# Patient Record
Sex: FEMALE | Race: WHITE | NOT HISPANIC OR LATINO | Employment: OTHER | URBAN - METROPOLITAN AREA
[De-identification: names, ages, dates, MRNs, and addresses within clinical notes are randomized per-mention and may not be internally consistent; named-entity substitution may affect disease eponyms.]

---

## 2017-01-05 ENCOUNTER — TELEPHONE (OUTPATIENT)
Dept: FAMILY MEDICINE | Facility: CLINIC | Age: 72
End: 2017-01-05

## 2017-01-05 LAB — INR PPP: 3.1

## 2017-01-05 NOTE — TELEPHONE ENCOUNTER
----- Message from Lena Xiao sent at 1/5/2017  2:01 PM CST -----  Contact: [facility]/[phone]  Facial drooping and need to speak to you.  Call [facility]/Bruna at [phone].

## 2017-01-06 ENCOUNTER — TELEPHONE (OUTPATIENT)
Dept: FAMILY MEDICINE | Facility: CLINIC | Age: 72
End: 2017-01-06

## 2017-01-06 ENCOUNTER — ANTI-COAG VISIT (OUTPATIENT)
Dept: CARDIOLOGY | Facility: CLINIC | Age: 72
End: 2017-01-06

## 2017-01-06 ENCOUNTER — PATIENT OUTREACH (OUTPATIENT)
Dept: ADMINISTRATIVE | Facility: HOSPITAL | Age: 72
End: 2017-01-06

## 2017-01-06 DIAGNOSIS — I82.A19 AXILLARY VEIN THROMBOSIS: ICD-10-CM

## 2017-01-06 NOTE — TELEPHONE ENCOUNTER
----- Message from Geno Ordoñez sent at 1/5/2017  4:35 PM CST -----  Contact: [facility] # [phone]  Returning call to Su  Call back on # [phone]  thanks

## 2017-01-06 NOTE — LETTER
January 6, 2017    Kalen Del Cid  [address] [address] [address]             [facility]  [address]  [place] [address]  Phone: [phone] Dear Mrs. Del Cid:    [facility] is committed to your overall health.  To help you get the most out of each of your visits, we will review your information to make sure you are up to date on all of your recommended tests and/or procedures.      Dr. Valle         has found that you may be due for:    Mammogram  Annual Dilated Eye Exam    If you have had any of the above done at another facility, please bring the records or information with you so that your record at [facility] will be complete.     If you are currently taking medication , please bring it with you to your appointment for review.    If you have any questions or concerns, please don't hesitate to call.    Sincerely,      Maame Gutierres  Clinical Care Coordinator  [facility] [address] [place] [address]  Phone: [phone]   Fax: [phone]

## 2017-01-06 NOTE — TELEPHONE ENCOUNTER
Spoke to Nurse Mcfarland and she states that that on yesterday she went to discharge the pt but couldn't because the pt had experienced a fall on Tuesday and has some bruising in a T  shape around forehead and eyes.     Nurse Bruna states the pt had irregular pupil non reactive on the right side. Right eye droopy and corner of mouth on right side sunken in. States she does not know if it could have been a TIA or CVA. States she is going to continue to observe her but wanted to let you know. Please advise. Thanks.

## 2017-01-06 NOTE — PROGRESS NOTES
[facility] for deja informing her of dosing and next inr check date, she was asked to return call to clinic to confirm she received message.

## 2017-01-10 NOTE — TELEPHONE ENCOUNTER
Spoke to nurse Mcfarland and she states she did advise pt to go to the ER but the pt sister refused stating the pt did not show signs of distress. Nurse Mcfarland states that she is scheduled to go out to see the pt on Thursday and will f/u with you.

## 2017-01-11 ENCOUNTER — HOSPITAL ENCOUNTER (OUTPATIENT)
Dept: RADIOLOGY | Facility: HOSPITAL | Age: 72
Discharge: HOME | End: 2017-01-11
Attending: FAMILY MEDICINE
Payer: MEDICARE

## 2017-01-11 DIAGNOSIS — Z12.39 SCREENING FOR BREAST CANCER: ICD-10-CM

## 2017-01-11 DIAGNOSIS — Z12.31 ENCOUNTER FOR SCREENING MAMMOGRAM FOR MALIGNANT NEOPLASM OF BREAST: ICD-10-CM

## 2017-01-11 PROCEDURE — 77067 SCR MAMMO BI INCL CAD: CPT | Mod: TC

## 2017-01-11 PROCEDURE — 77067 SCR MAMMO BI INCL CAD: CPT | Mod: 26,,, | Performed by: RADIOLOGY

## 2017-01-11 PROCEDURE — 77063 BREAST TOMOSYNTHESIS BI: CPT | Mod: 26,,, | Performed by: RADIOLOGY

## 2017-01-16 ENCOUNTER — ANTI-COAG VISIT (OUTPATIENT)
Dept: CARDIOLOGY | Facility: CLINIC | Age: 72
End: 2017-01-16

## 2017-01-16 DIAGNOSIS — I82.A19 AXILLARY VEIN THROMBOSIS: ICD-10-CM

## 2017-01-16 LAB — INR PPP: 2.7

## 2017-01-20 ENCOUNTER — OFFICE VISIT (OUTPATIENT)
Dept: FAMILY MEDICINE | Facility: CLINIC | Age: 72
End: 2017-01-20
Payer: MEDICARE

## 2017-01-20 VITALS
HEART RATE: 68 BPM | HEIGHT: 61 IN | SYSTOLIC BLOOD PRESSURE: 104 MMHG | RESPIRATION RATE: 18 BRPM | OXYGEN SATURATION: 96 % | DIASTOLIC BLOOD PRESSURE: 66 MMHG | BODY MASS INDEX: 20.93 KG/M2 | WEIGHT: 110.88 LBS

## 2017-01-20 DIAGNOSIS — E11.69 TYPE 2 DIABETES MELLITUS WITH OTHER SPECIFIED COMPLICATION, UNSPECIFIED LONG TERM INSULIN USE STATUS: Primary | ICD-10-CM

## 2017-01-20 DIAGNOSIS — I25.10 CORONARY ARTERY DISEASE INVOLVING NATIVE CORONARY ARTERY OF NATIVE HEART WITHOUT ANGINA PECTORIS: ICD-10-CM

## 2017-01-20 DIAGNOSIS — G30.9 ALZHEIMER'S DEMENTIA WITH BEHAVIORAL DISTURBANCE, UNSPECIFIED TIMING OF DEMENTIA ONSET: ICD-10-CM

## 2017-01-20 DIAGNOSIS — I10 ESSENTIAL HYPERTENSION: ICD-10-CM

## 2017-01-20 DIAGNOSIS — E78.00 HYPERCHOLESTEREMIA: ICD-10-CM

## 2017-01-20 DIAGNOSIS — T74.11XS: ICD-10-CM

## 2017-01-20 DIAGNOSIS — I44.7 LBBB (LEFT BUNDLE BRANCH BLOCK): ICD-10-CM

## 2017-01-20 DIAGNOSIS — Z97.4 HEARING AID WORN: ICD-10-CM

## 2017-01-20 DIAGNOSIS — F02.81 ALZHEIMER'S DEMENTIA WITH BEHAVIORAL DISTURBANCE, UNSPECIFIED TIMING OF DEMENTIA ONSET: ICD-10-CM

## 2017-01-20 DIAGNOSIS — H26.9 CATARACT, UNSPECIFIED CATARACT TYPE, UNSPECIFIED LATERALITY: ICD-10-CM

## 2017-01-20 DIAGNOSIS — Z95.5 S/P CORONARY ARTERY STENT PLACEMENT: ICD-10-CM

## 2017-01-20 PROCEDURE — 99213 OFFICE O/P EST LOW 20 MIN: CPT | Mod: PBBFAC,PO | Performed by: FAMILY MEDICINE

## 2017-01-20 PROCEDURE — 99214 OFFICE O/P EST MOD 30 MIN: CPT | Mod: S$PBB,,, | Performed by: FAMILY MEDICINE

## 2017-01-20 PROCEDURE — 99999 PR PBB SHADOW E&M-EST. PATIENT-LVL III: CPT | Mod: PBBFAC,,, | Performed by: FAMILY MEDICINE
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
## 2017-01-27 DIAGNOSIS — I25.10 CORONARY ARTERY DISEASE INVOLVING NATIVE CORONARY ARTERY WITHOUT ANGINA PECTORIS, UNSPECIFIED WHETHER NATIVE OR TRANSPLANTED HEART: ICD-10-CM

## 2017-01-27 DIAGNOSIS — E78.00 HYPERCHOLESTEREMIA: ICD-10-CM

## 2017-01-27 DIAGNOSIS — Z95.5 S/P CORONARY ARTERY STENT PLACEMENT: ICD-10-CM

## 2017-01-27 RX ORDER — ATORVASTATIN CALCIUM 40 MG/1
TABLET, FILM COATED ORAL
Qty: 30 TABLET | Refills: 5 | Status: SHIPPED | OUTPATIENT
Start: 2017-01-27

## 2017-01-31 ENCOUNTER — OFFICE VISIT (OUTPATIENT)
Dept: OPTOMETRY | Facility: CLINIC | Age: 72
End: 2017-01-31
Payer: MEDICARE

## 2017-01-31 DIAGNOSIS — E11.9 DIABETES MELLITUS TYPE 2 WITHOUT RETINOPATHY: Primary | ICD-10-CM

## 2017-01-31 DIAGNOSIS — H43.819 POSTERIOR VITREOUS DETACHMENT, UNSPECIFIED LATERALITY: ICD-10-CM

## 2017-01-31 DIAGNOSIS — Z96.1 BILATERAL PSEUDOPHAKIA: ICD-10-CM

## 2017-01-31 DIAGNOSIS — H31.012 MACULAR SCAR, LEFT: ICD-10-CM

## 2017-01-31 DIAGNOSIS — Z13.5 GLAUCOMA SCREENING: ICD-10-CM

## 2017-01-31 DIAGNOSIS — H21.561 PUPILLARY ABNORMALITIES, RIGHT: ICD-10-CM

## 2017-01-31 PROCEDURE — 92004 COMPRE OPH EXAM NEW PT 1/>: CPT | Mod: S$PBB,,, | Performed by: OPTOMETRIST

## 2017-01-31 PROCEDURE — 99212 OFFICE O/P EST SF 10 MIN: CPT | Mod: PBBFAC,PO | Performed by: OPTOMETRIST

## 2017-01-31 PROCEDURE — 99999 PR PBB SHADOW E&M-EST. PATIENT-LVL II: CPT | Mod: PBBFAC,,, | Performed by: OPTOMETRIST

## 2017-01-31 NOTE — LETTER
January 31, 2017      HEIDY Valle MD  [address] [facility] [address] [place] [address]           [place] [facility]  [address] [facility] [place] [address]  Phone: [phone]  Fax: [phone]          Patient: Kalen Del Cid   MR Number: [id number]   YOB: 1945   Date of Visit: 1/31/2017       Dear Dr. HEIDY Valle:    Thank you for referring Kalen Del Cid to me for evaluation. Attached you will find relevant portions of my assessment and plan of care.    If you have questions, please do not hesitate to call me. I look forward to following Kalen Del Cid along with you.    Sincerely,    ROLA Galvez, OD    Enclosure  CC:  No Recipients    If you would like to receive this communication electronically, please contact [email or web address] or [phone] to request more information on [facility] access.    For providers and/or their staff who would like to refer a patient to [facility], please contact us through our one-stop-shop provider referral line, [facility], at [phone].    If you feel you have received this communication in error or would no longer like to receive these types of communications, please e-mail [email or web address]

## 2017-01-31 NOTE — PATIENT INSTRUCTIONS
"FLASHES / FLOATERS / POSTERIOR VITREOUS DETACHMENT    Call the clinic if you have any further changes in symptoms.  Including:  Increased numbers of floaters or flashing lights, dimness or darkness that moves through or stays constant in your vision, or any pain in the eye (s).            DRY EYES:  Use Over The Counter artificial tears as needed for dry eye symptoms.  Some common brands include:  Systane, Optive, and Refresh.  These drops can be used as frequently as desired, but may be most helpful use during long periods of concentrated work.  For example, reading / working at the computer.  Avoid drops that "get redness out", as these contain medication that may further irritate the eyes.    ALLERGY EYES / SYMPTOMS:    Over the counter medications include--Zaditor and Alaway  Use as directed 1-2 drops daily for symptoms of itching / watering eyes.  These drops will not help for dry eye or exposure symptoms.    DIABETES AND THE EYE / DIABETIC RETINOPATHY    Diabetic retinopathy is a condition occurring in persons with diabetes, which causes progressive damage to the retina, the light sensitive lining at the back of the eye. It is a serious sight-threatening complication of diabetes.    Diabetic retinopathy is the result of damage to the tiny blood vessels that nourish the retina. They leak blood and other fluids that cause swelling of retinal tissue and clouding of vision. The condition usually affects both eyes. The longer a person has diabetes, the more likely they will develop diabetic retinopathy. If left untreated, diabetic retinopathy can cause blindness.  There are two basic types of diabetic retinopathy:    Background or nonproliferative diabetic retinopathy (NPDR)  Nonproliferative diabetic retinopathy (NPDR) is the earliest stage of diabetic retinopathy. With this condition, damaged blood vessels in the retina begin to leak extra fluid and small amounts of blood into the eye. Sometimes, deposits of " cholesterol or other fats from the blood may leak into the retina. Many people with diabetes have mild NPDR, which usually does not affect their vision. However, if their vision is affected, it is the result of macular edema and macular ischemia.    If vision is affected due to macular changes, a consult with a Retina Specialist may be advised.  This is an ophthalmologist that treats retina conditions, including diabetic retinopathy.     Proliferative diabetic retinopathy (PDR)  Proliferative diabetic retinopathy (PDR) mainly occurs when many of the blood vessels in the retina close, preventing enough blood flow. In an attempt to supply blood to the area where the original vessels closed, the retina responds by growing new blood vessels. This is called neovascularization. However, these new blood vessels are abnormal and do not supply the retina with proper blood flow. The new vessels are also often accompanied by scar tissue that may cause the retina to wrinkle or detach. PDR may cause more severe vision loss than NPDR because it can affect both central and peripheral vision.     A patient diagnosed with proliferative diabetic eye disease will be referred to a retinal specialist for consultation.    Often there are no visual symptoms in the early stages of diabetic retinopathy. That is why our eye care professionals recommend that everyone with diabetes have a comprehensive dilated eye examination once a year. Early detection and treatment can limit the potential for significant vision loss from diabetic retinopathy.

## 2017-01-31 NOTE — PROGRESS NOTES
HPI     Blurred Vision    Additional comments: blurred va//           Concerns About Ocular Health    Additional comments: presents with sister           Comments   Had 2 sx on Right//  Blurred va we think//  Unable to get much info out of   the pt.  Sister could only give some info//  Has had 2 sx not sure what   for and or which/  Sister thinks it was RD's//  She has also had 1 cat   sx//  Not sure if any floaters or flashes//    Patient w/ poor hearing and essentially non verbal       Last edited by ROLA Galvez, OD on 1/31/2017  9:38 AM. (History)            Assessment /Plan     For exam results, see Encounter Report.    Posterior vitreous detachment, unspecified laterality    Pupillary abnormalities, right    Bilateral pseudophakia    Glaucoma screening      ***

## 2017-01-31 NOTE — PROGRESS NOTES
HPI     Blurred Vision    Additional comments: blurred va//           Concerns About Ocular Health    Additional comments: presents with sister           Comments   Had 2 sx on Right//  Blurred va we think//  Unable to get much info out of   the pt.  Sister could only give some info//  Has had 2 sx not sure what   for and or which/  Sister thinks it was RD's//  She has also had 1 cat   sx//  Not sure if any floaters or flashes//    Patient w/ poor hearing and essentially non verbal       Last edited by ROLA Galvez, OD on 1/31/2017  9:38 AM. (History)            Assessment /Plan     For exam results, see Encounter Report.    Diabetes mellitus type 2 without retinopathy    Posterior vitreous detachment, unspecified laterality    Macular scar, left    Pupillary abnormalities, right    Glaucoma screening    Bilateral pseudophakia      1. Very poor coop with DFE, but post pole appears without retinopathy OU  Continue to control BP and glucose  2. RD precautions given  3. Longstanding per pt sister w/ poor vision  4. Surgical pupil, corneal sutures  5. Not suspect  6.   OD aciol  OS pciol  Stable OU    No responsive for refractionl--assumed based on sister comments appears wnl   Discussed and educated patient on current findings /plan.  RTC 1 year, prn if any changes / issues

## 2017-01-31 NOTE — MR AVS SNAPSHOT
[place] [facility]  [address]  [facility] [address]  Phone: [phone]  Fax: [phone]                  Kalen Del Cid   2017 9:30 AM   Office Visit    Description:  Female : 1945   Provider:  ROLA Galvez OD   Department:  [place] [facility]           Reason for Visit     Blurred Vision     Concerns About Ocular Health           Diagnoses this Visit        Comments    Diabetes mellitus type 2 without retinopathy    -  Primary     Posterior vitreous detachment, unspecified laterality         Pupillary abnormalities, right         Bilateral pseudophakia         Glaucoma screening                To Do List           Future Appointments        Provider Department Dept Phone    2017 10:15 AM LAB, [place] [facility] [phone]    2017 1:00 PM HEIDY Valle MD [facility] [phone]      Goals (5 Years of Data)     None      Follow-Up and Disposition     Return in about 1 year (around 2018) for Annual Diabetic Eye Exam.      [facility]     [facility] Nurse Care [facility] - 24/7 Assistance  Registered nurses in the [facility] provide clinical advisement, health education, appointment booking, and other advisory services.  Call for this free service at [phone].             Medications           Message regarding Medications     Verify the changes and/or additions to your medication regime listed below are the same as discussed with your clinician today.  If any of these changes or additions are incorrect, please notify your healthcare provider.             Verify that the below list of medications is an accurate representation of the medications you are currently taking.  If none reported, the list may be blank. If incorrect, please contact your healthcare provider. Carry this list with you in case of emergency.           Current Medications     atorvastatin (LIPITOR) 40 MG tablet TAKE 1 TABLET  "BY MOUTH ONCE DAILY    divalproex (DEPAKOTE) 125 MG EC tablet Take 1 tablet (125 mg total) by mouth 3 (three) times daily.    escitalopram oxalate (LEXAPRO) 10 MG tablet Take 1 tablet (10 mg total) by mouth once daily.    haloperidol (HALDOL) 0.5 MG tablet Take 1 tablet (0.5 mg total) by mouth 3 (three) times daily as needed.    metformin (GLUCOPHAGE) 500 MG tablet Take 1 tablet (500 mg total) by mouth 2 (two) times daily with meals.    olanzapine (ZYPREXA) 2.5 MG tablet TAKE 1 TABLET(2.5 MG) BY MOUTH EVERY DAY    trazodone (DESYREL) 50 MG tablet Take 1 tablet (50 mg total) by mouth every evening.    warfarin (COUMADIN) 5 MG tablet Take 0-0.5 tablets (0-2.5 mg total) by mouth Daily.           Clinical Reference Information           Allergies as of 1/31/2017     No Known Allergies      Immunizations Administered on Date of Encounter - 1/31/2017     None      Instructions    FLASHES / FLOATERS / POSTERIOR VITREOUS DETACHMENT    Call the clinic if you have any further changes in symptoms.  Including:  Increased numbers of floaters or flashing lights, dimness or darkness that moves through or stays constant in your vision, or any pain in the eye (s).            DRY EYES:  Use Over The Counter artificial tears as needed for dry eye symptoms.  Some common brands include:  Systane, Optive, and Refresh.  These drops can be used as frequently as desired, but may be most helpful use during long periods of concentrated work.  For example, reading / working at the computer.  Avoid drops that "get redness out", as these contain medication that may further irritate the eyes.    ALLERGY EYES / SYMPTOMS:    Over the counter medications include--Zaditor and Alaway  Use as directed 1-2 drops daily for symptoms of itching / watering eyes.  These drops will not help for dry eye or exposure symptoms.    DIABETES AND THE EYE / DIABETIC RETINOPATHY    Diabetic retinopathy is a condition occurring in persons with diabetes, which causes " progressive damage to the retina, the light sensitive lining at the back of the eye. It is a serious sight-threatening complication of diabetes.    Diabetic retinopathy is the result of damage to the tiny blood vessels that nourish the retina. They leak blood and other fluids that cause swelling of retinal tissue and clouding of vision. The condition usually affects both eyes. The longer a person has diabetes, the more likely they will develop diabetic retinopathy. If left untreated, diabetic retinopathy can cause blindness.  There are two basic types of diabetic retinopathy:    Background or nonproliferative diabetic retinopathy (NPDR)  Nonproliferative diabetic retinopathy (NPDR) is the earliest stage of diabetic retinopathy. With this condition, damaged blood vessels in the retina begin to leak extra fluid and small amounts of blood into the eye. Sometimes, deposits of cholesterol or other fats from the blood may leak into the retina. Many people with diabetes have mild NPDR, which usually does not affect their vision. However, if their vision is affected, it is the result of macular edema and macular ischemia.    If vision is affected due to macular changes, a consult with a Retina Specialist may be advised.  This is an ophthalmologist that treats retina conditions, including diabetic retinopathy.     Proliferative diabetic retinopathy (PDR)  Proliferative diabetic retinopathy (PDR) mainly occurs when many of the blood vessels in the retina close, preventing enough blood flow. In an attempt to supply blood to the area where the original vessels closed, the retina responds by growing new blood vessels. This is called neovascularization. However, these new blood vessels are abnormal and do not supply the retina with proper blood flow. The new vessels are also often accompanied by scar tissue that may cause the retina to wrinkle or detach. PDR may cause more severe vision loss than NPDR because it can affect both  central and peripheral vision.     A patient diagnosed with proliferative diabetic eye disease will be referred to a retinal specialist for consultation.    Often there are no visual symptoms in the early stages of diabetic retinopathy. That is why our eye care professionals recommend that everyone with diabetes have a comprehensive dilated eye examination once a year. Early detection and treatment can limit the potential for significant vision loss from diabetic retinopathy.

## 2017-01-31 NOTE — PROGRESS NOTES
Ok to send patient or the HH the paperwork for the patient to sign. We can start the process of obtaining a meter for the patient. How much longer will the patient have HH?

## 2017-02-02 ENCOUNTER — ANTI-COAG VISIT (OUTPATIENT)
Dept: CARDIOLOGY | Facility: CLINIC | Age: 72
End: 2017-02-02

## 2017-02-02 ENCOUNTER — TELEPHONE (OUTPATIENT)
Dept: FAMILY MEDICINE | Facility: CLINIC | Age: 72
End: 2017-02-02

## 2017-02-02 LAB — INR PPP: 3.9

## 2017-02-02 NOTE — TELEPHONE ENCOUNTER
----- Message from Clovis De La Cruz sent at 2/2/2017 12:10 PM CST -----  Contact: Maria Elena with [employer]  Patient had a fall on Monday, 01/30/2017 and Maria Elena with [facility] is requesting an order for a home x-ray of the patient's right shoulder. Please fax the order to [phone]. If you have any questions, call Maria Elena at [phone]. Thanks.

## 2017-02-03 ENCOUNTER — TELEPHONE (OUTPATIENT)
Dept: FAMILY MEDICINE | Facility: CLINIC | Age: 72
End: 2017-02-03

## 2017-02-03 NOTE — TELEPHONE ENCOUNTER
----- Message from Tarsha Dykes sent at 2/3/2017 11:30 AM CST -----  [facility] is calling regarding patients fall Monday & a request for a x-ray of rt shoulder, please call Melani, [phone]

## 2017-02-03 NOTE — TELEPHONE ENCOUNTER
Spoke to patient's sister, Farheen. Informed her home xray image would not be as clear. She states she will bring her sister in for xray. Please put in order. She also wants to know if she needs a urinalysis because her urine has been very strong. Please advise.

## 2017-02-06 ENCOUNTER — HOSPITAL ENCOUNTER (OUTPATIENT)
Dept: RADIOLOGY | Facility: HOSPITAL | Age: 72
Discharge: HOME | End: 2017-02-06
Attending: NURSE PRACTITIONER
Payer: MEDICARE

## 2017-02-06 ENCOUNTER — OFFICE VISIT (OUTPATIENT)
Dept: FAMILY MEDICINE | Facility: CLINIC | Age: 72
End: 2017-02-06
Payer: MEDICARE

## 2017-02-06 ENCOUNTER — NURSE TRIAGE (OUTPATIENT)
Dept: ADMINISTRATIVE | Facility: CLINIC | Age: 72
End: 2017-02-06

## 2017-02-06 VITALS
OXYGEN SATURATION: 95 % | SYSTOLIC BLOOD PRESSURE: 104 MMHG | DIASTOLIC BLOOD PRESSURE: 60 MMHG | BODY MASS INDEX: 22.64 KG/M2 | HEIGHT: 61 IN | WEIGHT: 119.94 LBS | HEART RATE: 90 BPM

## 2017-02-06 DIAGNOSIS — M25.511 ACUTE PAIN OF RIGHT SHOULDER: ICD-10-CM

## 2017-02-06 DIAGNOSIS — R29.6 FREQUENT FALLS: ICD-10-CM

## 2017-02-06 DIAGNOSIS — N30.01 ACUTE CYSTITIS WITH HEMATURIA: ICD-10-CM

## 2017-02-06 DIAGNOSIS — R82.90 ABNORMAL URINE ODOR: ICD-10-CM

## 2017-02-06 DIAGNOSIS — R29.6 FREQUENT FALLS: Primary | ICD-10-CM

## 2017-02-06 LAB
BILIRUB SERPL-MCNC: ABNORMAL MG/DL
BLOOD URINE, POC: 50
COLOR, POC UA: ABNORMAL
GLUCOSE UR QL STRIP: ABNORMAL
KETONES UR QL STRIP: ABNORMAL
LEUKOCYTE ESTERASE URINE, POC: ABNORMAL
NITRITE, POC UA: ABNORMAL
PH, POC UA: 5
PROTEIN, POC: ABNORMAL
SPECIFIC GRAVITY, POC UA: 1.02
UROBILINOGEN, POC UA: ABNORMAL

## 2017-02-06 PROCEDURE — 99999 PR PBB SHADOW E&M-EST. PATIENT-LVL III: CPT | Mod: PBBFAC,,, | Performed by: NURSE PRACTITIONER

## 2017-02-06 PROCEDURE — 99214 OFFICE O/P EST MOD 30 MIN: CPT | Mod: S$PBB,,, | Performed by: NURSE PRACTITIONER

## 2017-02-06 RX ORDER — CIPROFLOXACIN 500 MG/1
500 TABLET ORAL 2 TIMES DAILY
Qty: 10 TABLET | Refills: 0 | Status: ON HOLD | OUTPATIENT
Start: 2017-02-06 | End: 2017-02-13 | Stop reason: HOSPADM

## 2017-02-06 NOTE — MR AVS SNAPSHOT
[facility]  [address]  [facility] [address]  Phone: [phone]  Fax: [phone]                  Kalen Del Cid   2017 9:20 AM   Office Visit    Description:  Female : 1945   Provider:  Deepa Palomino NP   Department:  [facility]           Reason for Visit     Multiple falls     Swollen arms           Diagnoses this Visit        Comments    Frequent falls    -  Primary     Acute pain of right shoulder         Abnormal urine odor         Acute cystitis with hematuria                To Do List           Future Appointments        Provider Department Dept Phone    2017 11:30 AM [facility] XRFL1 [facility] [phone]    2017 11:45 AM [facility] XR3 [facility] [phone]    2017 10:15 AM LAB, [place] [facility] [phone]    2017 1:00 PM HEIDY Valle MD [facility] [phone]      Goals (5 Years of Data)     None       These Medications        Disp Refills Start End    ciprofloxacin HCl (CIPRO) 500 MG tablet 10 tablet 0 2017    Take 1 tablet (500 mg total) by mouth 2 (two) times daily. - Oral    Pharmacy: [facility] [address] [place] [address] [id number] [place] [address] 25 AT [facility] [address] Ph #: [phone]         [facility] On Call     [facility] Care [facility] -  Assistance  Registered nurses in the [facility] provide clinical advisement, health education, appointment booking, and other advisory services.  Call for this free service at [phone].             Medications           Message regarding Medications     Verify the changes and/or additions to your medication regime listed below are the same as discussed with your clinician today.  If any of these changes or additions are incorrect, please notify your healthcare provider.        START taking these NEW medications        Refills    ciprofloxacin HCl  "(CIPRO) 500 MG tablet 0    Sig: Take 1 tablet (500 mg total) by mouth 2 (two) times daily.    Class: Normal    Route: Oral           Verify that the below list of medications is an accurate representation of the medications you are currently taking.  If none reported, the list may be blank. If incorrect, please contact your healthcare provider. Carry this list with you in case of emergency.           Current Medications     atorvastatin (LIPITOR) 40 MG tablet TAKE 1 TABLET BY MOUTH ONCE DAILY    divalproex (DEPAKOTE) 125 MG EC tablet Take 1 tablet (125 mg total) by mouth 3 (three) times daily.    escitalopram oxalate (LEXAPRO) 10 MG tablet Take 1 tablet (10 mg total) by mouth once daily.    haloperidol (HALDOL) 0.5 MG tablet Take 1 tablet (0.5 mg total) by mouth 3 (three) times daily as needed.    metformin (GLUCOPHAGE) 500 MG tablet Take 1 tablet (500 mg total) by mouth 2 (two) times daily with meals.    olanzapine (ZYPREXA) 2.5 MG tablet TAKE 1 TABLET(2.5 MG) BY MOUTH EVERY DAY    trazodone (DESYREL) 50 MG tablet Take 1 tablet (50 mg total) by mouth every evening.    warfarin (COUMADIN) 5 MG tablet Take 0-0.5 tablets (0-2.5 mg total) by mouth Daily.    ciprofloxacin HCl (CIPRO) 500 MG tablet Take 1 tablet (500 mg total) by mouth 2 (two) times daily.           Clinical Reference Information           Your Vitals Were     BP Pulse Height Weight SpO2 BMI    104/60 90 5' 1" (1.549 m) 54.4 kg (119 lb 14.9 oz) 95% 22.66 kg/m2      Blood Pressure          Most Recent Value    BP  104/60      Allergies as of 2/6/2017     No Known Allergies      Immunizations Administered on Date of Encounter - 2/6/2017     None      Orders Placed During Today's Visit      Normal Orders This Visit    POCT urinalysis, dipstick or tablet reag     Future Labs/Procedures Expected by Expires    X-Ray Humerus 2 View Right  2/6/2017 2/6/2018    X-Ray Scapula Right  2/6/2017 2/6/2018 2/6/2017 10:54 AM - Su Brunson LPN    "   Component Results     Component    Color, UA    veronique    Spec Grav, UA    1.020    pH, UA    5    WBC, UA    ++    Nitrite, UA    neg    Protein, UA    trace    Glucose, UA    norm    Ketones, UA    neg    Urobilinogen, UA    norm    Bilirubin, UA    neg    Blood, UA    50            Language Assistance Services     ATTENTION: Language assistance services are available, free of charge. Please call [phone].      ATENCIÓN: Si habla español, tiene a macias disposición servicios gratuitos de asistencia lingüística. Llame al [phone].     CHÚ Ý: N?u b?n nói Ti?ng Vi?t, có các d?ch v? h? tr? ngôn ng? mi?n phí dành cho b?n. G?i s? [phone].         [facility] complies with applicable Federal civil rights laws and does not discriminate on the basis of race, color, national origin, age, disability, or sex.

## 2017-02-06 NOTE — PROGRESS NOTES
Subjective:       Patient ID: Kalen Del Cid is a 71 y.o. female.    Chief Complaint: Multiple falls and Swollen arms  She was last seen in primary care by Dr. Valle on 01/20/2017. She was last seen by me on 07/26/2016. She is accompanied by her sister who is her caregiver.  HPI   She fell 3-4 times over the pat 3 weeks. She fell out of the bed on yesterday and her right arm is painful and tender.  Vitals:    02/06/17 0910   BP: 104/60   Pulse: 90     Review of Systems   Musculoskeletal:        Sister states her right shoulder is hurting       Objective:      Physical Exam   Constitutional: Vital signs are normal. She appears well-developed and well-nourished.   She sits int he wheelchair and her communicative pattern is very limited. She says words randomly and her conversation is eratic and does not    HENT:   Head: Normocephalic and atraumatic.   Nose: Nose normal.   Cardiovascular: Normal rate and regular rhythm.    Pulmonary/Chest: Effort normal and breath sounds normal.   Abdominal: Soft. Bowel sounds are normal.   Musculoskeletal:        Right shoulder: She exhibits decreased range of motion and tenderness.   She grimaces when touching right upper arm and with passive range of motion   Lymphadenopathy:        Head (right side): No submental, no submandibular, no tonsillar, no preauricular, no posterior auricular and no occipital adenopathy present.        Head (left side): No submental, no submandibular, no tonsillar, no preauricular, no posterior auricular and no occipital adenopathy present.   Neurological: She is alert.   Skin: Skin is warm, dry and intact.   Psychiatric: She is withdrawn. Cognition and memory are impaired.   Speech pattern is random and her words do not make intelligent sentences or conversation. Unable to answer any questions regarding any pain or discomfort.  She is constantly picking and pulling on her clothing   Nursing note and vitals reviewed.      Assessment & Plan:        Frequent falls  -     X-Ray Scapula Right; Future; Expected date: 2/6/17  -     X-Ray Humerus 2 View Right; Future; Expected date: 2/6/17    Acute pain of right shoulder  -     X-Ray Scapula Right; Future; Expected date: 2/6/17  -     X-Ray Humerus 2 View Right; Future; Expected date: 2/6/17    Abnormal urine odor  -     POCT urinalysis, dipstick or tablet reag  -     ciprofloxacin HCl (CIPRO) 500 MG tablet; Take 1 tablet (500 mg total) by mouth 2 (two) times daily.  Dispense: 10 tablet; Refill: 0    Acute cystitis with hematuria  -     ciprofloxacin HCl (CIPRO) 500 MG tablet; Take 1 tablet (500 mg total) by mouth 2 (two) times daily.  Dispense: 10 tablet; Refill: 0          No Follow-up on file. Keep appt with Dr. Valle 04/21/2017

## 2017-02-07 PROBLEM — R65.21 SEPTIC SHOCK: Status: ACTIVE | Noted: 2017-02-07

## 2017-02-07 PROBLEM — A41.9 SEPTIC SHOCK: Status: ACTIVE | Noted: 2017-02-07

## 2017-02-07 PROBLEM — N39.0 URINARY TRACT INFECTION WITHOUT HEMATURIA: Status: ACTIVE | Noted: 2017-02-07

## 2017-02-07 PROBLEM — T45.511A COUMADIN TOXICITY: Status: ACTIVE | Noted: 2017-02-07

## 2017-02-07 NOTE — TELEPHONE ENCOUNTER
"  Reason for Disposition   Fever > 103 F (39.4 C)     104.1 axillary now; was 106.0 axillary an hour ago, but was given tylenol liquid, 5 ml, by the  nurse for temp 106.0.  Seen in clinic today, but temp not noted on VS flow sheet.  She is on Cipro; has had just one dose. Recommended ED now.  Her caregiver will bring her to the [facility] ED now, as it is near their home.  Assured Ms. Flaherty that I will message CROW Valle now.  Please contact caller directly with any additional care advice.    Answer Assessment - Initial Assessment Questions  1. INFECTION: "What infection is the antibiotic being given for?"      Bacterial infection , seen in clinic today.    2. ANTIBIOTIC: "What antibiotic are you taking" "How many times per day?"      Cipro.     3. DURATION: "When was the antibiotic started?"      Today.  She has had one dose.    4. MAIN CONCERN OR SYMPTOM:  "What is your main concern right now?"      She looks worse.  Her temperature is 104.1 axillary, and was 106.0 axillary an hour ago.    5. BETTER-SAME-WORSE: "Are you getting better, staying the same, or getting worse compared to when you first started the antibiotics?" If getting worse, ask: "In what way?"       Worse.  Looking weak, and a high fever.    6. FEVER: "Do you have a fever?" If so, ask: "What is your temperature, how was it measured, and when did it start?"      104.1 now, axillary, but was 106.0 axillary an hour ago.  I gave her Tylenol then, and it came down to 104.1    7. SYMPTOMS: "Are there any other symptoms you're concerned about?" If so, ask: "When did it start?"        8. FOLLOW-UP APPOINTMENT: "Do you have a follow-up appointment with your doctor?"    Protocols used:  INFECTION ON ANTIBIOTIC FOLLOW-UP CALL-A-      Recommended Ms Conti (niece) take Cloie to ED now.  PH nearest to their home.  Axillary temp 104.1, but was 106.0 axillary before they gave tylenol.  Started cipro today, has had one dose, for hematuria.  Message to CROW " Carlyle Valle MD .  Please contact caller directly with any additional care advice.

## 2017-02-11 PROBLEM — E87.6 HYPOKALEMIA DUE TO LOSS OF POTASSIUM: Status: ACTIVE | Noted: 2017-02-11

## 2017-02-11 PROBLEM — E83.42 HYPOMAGNESEMIA: Status: ACTIVE | Noted: 2017-02-11

## 2017-02-14 ENCOUNTER — ANTI-COAG VISIT (OUTPATIENT)
Dept: CARDIOLOGY | Facility: CLINIC | Age: 72
End: 2017-02-14

## 2017-02-14 LAB — INR PPP: 8

## 2017-02-14 RX ORDER — HALOPERIDOL 0.5 MG/1
TABLET ORAL
Qty: 60 TABLET | Refills: 0 | Status: SHIPPED | OUTPATIENT
Start: 2017-02-14 | End: 2017-02-14 | Stop reason: SDUPTHER

## 2017-02-14 RX ORDER — HALOPERIDOL 0.5 MG/1
TABLET ORAL
Qty: 270 TABLET | Refills: 0 | Status: SHIPPED | OUTPATIENT
Start: 2017-02-14

## 2017-02-14 NOTE — TELEPHONE ENCOUNTER
----- Message from Marti Palomino sent at 2/14/2017  2:02 PM CST -----  Contact: [facility] health nurse-  Melani- [phone]  Patient was discharged from [facility] yesterday. Patient was advise a new rx macrobid would be prescribed for the patient. Patient was discharged from the hospital without the rx. The nurse asking for a rx for the rx. Thanks!

## 2017-02-14 NOTE — TELEPHONE ENCOUNTER
Phoned Melani at [facility] in regards to message below. Instructed Melani that the rxs had been e-scribed to pts pharmacy. Melani voiced understanding. CLC

## 2017-02-14 NOTE — PROGRESS NOTES
Spoke with pts caregiver gave dosing and next inr check date, pts caregiver voiced understanding. hh faxed.

## 2017-02-16 ENCOUNTER — ANTI-COAG VISIT (OUTPATIENT)
Dept: CARDIOLOGY | Facility: CLINIC | Age: 72
End: 2017-02-16

## 2017-02-16 LAB — INR PPP: 3.6

## 2017-02-16 NOTE — PROGRESS NOTES
Spoke with pts care giver gave dosing and next inr check date, pts care giver  voiced understanding.  hh faxed.

## 2017-02-21 ENCOUNTER — ANTI-COAG VISIT (OUTPATIENT)
Dept: CARDIOLOGY | Facility: CLINIC | Age: 72
End: 2017-02-21

## 2017-02-21 ENCOUNTER — TELEPHONE (OUTPATIENT)
Dept: FAMILY MEDICINE | Facility: CLINIC | Age: 72
End: 2017-02-21

## 2017-02-21 DIAGNOSIS — I82.A19 AXILLARY VEIN THROMBOSIS: Primary | ICD-10-CM

## 2017-02-21 DIAGNOSIS — R13.10 DYSPHAGIA, UNSPECIFIED TYPE: Primary | ICD-10-CM

## 2017-02-21 PROBLEM — I26.99 PULMONARY EMBOLUS: Status: ACTIVE | Noted: 2017-02-21

## 2017-02-21 LAB — INR PPP: 1.5

## 2017-02-21 RX ORDER — WARFARIN 2.5 MG/1
2.5 TABLET ORAL DAILY
Qty: 30 TABLET | Refills: 6 | Status: SHIPPED | OUTPATIENT
Start: 2017-02-21 | End: 2018-02-21

## 2017-02-21 NOTE — TELEPHONE ENCOUNTER
----- Message from Geno Ordoñez sent at 2/21/2017  8:56 AM CST -----  Contact: Melani with [facility] # [phone]  Status of paper work regarding Wheel Chair,rep states paper work was dropped off last week  Call placed to pod, no answer  Call back on # [phone]  thanks

## 2017-02-23 ENCOUNTER — TELEPHONE (OUTPATIENT)
Dept: FAMILY MEDICINE | Facility: CLINIC | Age: 72
End: 2017-02-23

## 2017-02-23 NOTE — TELEPHONE ENCOUNTER
Spoke to Farheen. Advised her that orders have been signed and will check to see if there wasany further paperwork faxed over since then for notes or clarification. Pt sister verbalized understanding

## 2017-02-23 NOTE — TELEPHONE ENCOUNTER
----- Message from Saba Parr sent at 2/23/2017  8:06 AM CST -----  Contact: deja,sister  Deja wants to speak with a nurse regarding orders for a hospital bed. Please call back at [phone] (home) [phone] (work)

## 2017-03-01 ENCOUNTER — ANTI-COAG VISIT (OUTPATIENT)
Dept: CARDIOLOGY | Facility: CLINIC | Age: 72
End: 2017-03-01

## 2017-03-01 LAB — INR PPP: 2.4

## 2017-03-07 ENCOUNTER — TELEPHONE (OUTPATIENT)
Dept: FAMILY MEDICINE | Facility: CLINIC | Age: 72
End: 2017-03-07

## 2017-03-07 NOTE — TELEPHONE ENCOUNTER
----- Message from Tanya Schwartz sent at 3/7/2017  9:16 AM CST -----  Contact: Naty with [employer]  Naty with [employer] is returning Jacey's call regarding patient's hospital bed.  Please call Naty at [phone].  Thanks!

## 2017-03-07 NOTE — TELEPHONE ENCOUNTER
----- Message from Aure Ordoñez sent at 3/6/2017  9:38 AM CST -----  Contact: jenni with [facility]#[phone]  jenni with [employer] [facility]#[phone] requesting a call from nurse need order for hospital bed to be signed.

## 2017-03-08 ENCOUNTER — ANTI-COAG VISIT (OUTPATIENT)
Dept: CARDIOLOGY | Facility: CLINIC | Age: 72
End: 2017-03-08

## 2017-03-08 LAB — INR PPP: 5.7

## 2017-03-10 ENCOUNTER — ANTI-COAG VISIT (OUTPATIENT)
Dept: CARDIOLOGY | Facility: CLINIC | Age: 72
End: 2017-03-10

## 2017-03-10 LAB — INR PPP: 1.9

## 2017-03-14 LAB — INR PPP: 2.1

## 2017-03-15 ENCOUNTER — ANTI-COAG VISIT (OUTPATIENT)
Dept: CARDIOLOGY | Facility: CLINIC | Age: 72
End: 2017-03-15

## 2017-03-23 ENCOUNTER — ANTI-COAG VISIT (OUTPATIENT)
Dept: CARDIOLOGY | Facility: CLINIC | Age: 72
End: 2017-03-23

## 2017-03-23 ENCOUNTER — TELEPHONE (OUTPATIENT)
Dept: FAMILY MEDICINE | Facility: CLINIC | Age: 72
End: 2017-03-23

## 2017-03-23 LAB — INR PPP: 2.1

## 2017-03-23 NOTE — TELEPHONE ENCOUNTER
----- Message from Zay Haskins sent at 3/23/2017 12:00 PM CDT -----  Bruna ([facility]) called to see if she can have Glucerna ordered/pls call back at [phone]

## 2017-03-24 ENCOUNTER — TELEPHONE (OUTPATIENT)
Dept: FAMILY MEDICINE | Facility: CLINIC | Age: 72
End: 2017-03-24

## 2017-03-24 NOTE — TELEPHONE ENCOUNTER
----- Message from Scooby Love sent at 3/24/2017  9:49 AM CDT -----  Contact: [facility] Barbra [place]  [phone]  Requesting for a Prior Authorization on the medication.  They have other questions about the diagnosis.  Please call [phone].   The Fax is:  [phone].     And the REFERENCE Number:  [id number]

## 2017-03-24 NOTE — TELEPHONE ENCOUNTER
Attempted to contact. Was forwarded to voice message. Left voice message for Makeda to return call to clinic.

## 2017-03-24 NOTE — TELEPHONE ENCOUNTER
----- Message from Scooby Love sent at 3/24/2017  1:00 PM CDT -----  Contact: Renetta Trivedi  Placed call to Pod.  Returning the call about working on a prior authorization for a medication.  Can you please call her back at [phone].  Thank you       REFERENCE  [id number]

## 2017-03-24 NOTE — TELEPHONE ENCOUNTER
Spoke Celi from [employer] PA dept. She states PA for glucerna was denied. She states she will fax over more information regarding denial.

## 2017-03-30 RX ORDER — ESCITALOPRAM OXALATE 10 MG/1
TABLET ORAL
Qty: 90 TABLET | Refills: 0 | Status: SHIPPED | OUTPATIENT
Start: 2017-03-30

## 2017-04-06 ENCOUNTER — ANTI-COAG VISIT (OUTPATIENT)
Dept: CARDIOLOGY | Facility: CLINIC | Age: 72
End: 2017-04-06

## 2017-04-06 LAB — INR PPP: 5.1

## 2017-04-06 NOTE — PROGRESS NOTES
Spoke with pt gave dosing and next inr check date pt voiced understanding. Spoke with  Nurse informed her to go do .

## 2017-04-06 NOTE — PROGRESS NOTES
Spoke with vince from  she states nurse states she tried to get a pt and the pt pulled away from her

## 2017-04-11 ENCOUNTER — TELEPHONE (OUTPATIENT)
Dept: FAMILY MEDICINE | Facility: CLINIC | Age: 72
End: 2017-04-11

## 2017-04-11 ENCOUNTER — ANTI-COAG VISIT (OUTPATIENT)
Dept: CARDIOLOGY | Facility: CLINIC | Age: 72
End: 2017-04-11

## 2017-04-11 LAB — INR PPP: 2.9

## 2017-04-11 NOTE — TELEPHONE ENCOUNTER
Labs needed will be drawn by home health nurse jorge luis. Pt is not able to make it to the clinic.

## 2017-04-11 NOTE — TELEPHONE ENCOUNTER
----- Message from Milagros Davenport sent at 4/11/2017  3:04 PM CDT -----  Contact: [facility],Bruna Mcfarland want to speak with a nurse regarding patient bloodwork orders please call back at [phone]

## 2017-04-18 ENCOUNTER — LAB VISIT (OUTPATIENT)
Dept: LAB | Facility: HOSPITAL | Age: 72
End: 2017-04-18
Attending: FAMILY MEDICINE
Payer: MEDICARE

## 2017-04-18 ENCOUNTER — ANTI-COAG VISIT (OUTPATIENT)
Dept: CARDIOLOGY | Facility: CLINIC | Age: 72
End: 2017-04-18

## 2017-04-18 DIAGNOSIS — I25.10 CORONARY ATHEROSCLEROSIS OF UNSPECIFIED TYPE OF VESSEL, NATIVE OR GRAFT: ICD-10-CM

## 2017-04-18 DIAGNOSIS — E11.9 DIABETES MELLITUS WITHOUT COMPLICATION: Primary | ICD-10-CM

## 2017-04-18 LAB
ALBUMIN SERPL BCP-MCNC: 2.7 G/DL
ALP SERPL-CCNC: 67 U/L
ALT SERPL W/O P-5'-P-CCNC: 6 U/L
ANION GAP SERPL CALC-SCNC: 9 MMOL/L
AST SERPL-CCNC: 13 U/L
BILIRUB SERPL-MCNC: 0.7 MG/DL
BUN SERPL-MCNC: 26 MG/DL
CALCIUM SERPL-MCNC: 9.9 MG/DL
CHLORIDE SERPL-SCNC: 104 MMOL/L
CHOLEST/HDLC SERPL: 2.7 {RATIO}
CO2 SERPL-SCNC: 27 MMOL/L
CREAT SERPL-MCNC: 0.8 MG/DL
EST. GFR  (AFRICAN AMERICAN): >60 ML/MIN/1.73 M^2
EST. GFR  (NON AFRICAN AMERICAN): >60 ML/MIN/1.73 M^2
GLUCOSE SERPL-MCNC: 95 MG/DL
HDL/CHOLESTEROL RATIO: 36.7 %
HDLC SERPL-MCNC: 33 MG/DL
HDLC SERPL-MCNC: 90 MG/DL
INR PPP: 8
INR PPP: >10
LDLC SERPL CALC-MCNC: 32.8 MG/DL
NONHDLC SERPL-MCNC: 57 MG/DL
POTASSIUM SERPL-SCNC: 4.3 MMOL/L
PROT SERPL-MCNC: 5.3 G/DL
PROTHROMBIN TIME: >100 SEC
SODIUM SERPL-SCNC: 140 MMOL/L
TRIGL SERPL-MCNC: 121 MG/DL

## 2017-04-18 PROCEDURE — 80053 COMPREHEN METABOLIC PANEL: CPT

## 2017-04-18 PROCEDURE — 80061 LIPID PANEL: CPT

## 2017-04-18 PROCEDURE — 36415 COLL VENOUS BLD VENIPUNCTURE: CPT | Mod: PO

## 2017-04-18 PROCEDURE — 85610 PROTHROMBIN TIME: CPT | Mod: PO

## 2017-04-19 ENCOUNTER — TELEPHONE (OUTPATIENT)
Dept: FAMILY MEDICINE | Facility: CLINIC | Age: 72
End: 2017-04-19

## 2017-04-19 NOTE — PROGRESS NOTES
Called deja to check on pt, to see if she is having any bleeding or any problems and to confirm that she did not give pt her coumadin last night or give it to her tonight she voiced understanding once again. She states no problems and no bleeding.

## 2017-04-19 NOTE — PROGRESS NOTES
Only if she is currently having bleeding; none reported on yesterday. She should eat some dark GLV.  Should next INR still be >10, or critical, we could proceed with the vitk tab at that time

## 2017-04-19 NOTE — PROGRESS NOTES
Spoke with pts sister informed her to eat dark GLV deja states pt does not eat that. I spoke with pharmD, pharmD suggest that pt drinks 2-3 v8 juice's within the next 24 hrs or have pt drink a smoothie with spinach and blueberries deja states she will try that

## 2017-04-19 NOTE — PROGRESS NOTES
Dr Villagran states he wanted to check in with us in regards to this pt her  came back >10 we addressed her fingerstick yesterday and pts sister deja is aware to hold for pts coumadin for two nights, he would like to know if we thought the pt needed Vit K ?

## 2017-04-20 ENCOUNTER — ANTI-COAG VISIT (OUTPATIENT)
Dept: CARDIOLOGY | Facility: CLINIC | Age: 72
End: 2017-04-20

## 2017-04-20 LAB — INR PPP: 6.6

## 2017-04-21 DIAGNOSIS — E11.69 TYPE 2 DIABETES MELLITUS WITH OTHER SPECIFIED COMPLICATION, UNSPECIFIED LONG TERM INSULIN USE STATUS: ICD-10-CM

## 2017-04-21 DIAGNOSIS — F02.81 ALZHEIMER'S DEMENTIA WITH BEHAVIORAL DISTURBANCE, UNSPECIFIED TIMING OF DEMENTIA ONSET: ICD-10-CM

## 2017-04-21 DIAGNOSIS — R62.7 ADULT FAILURE TO THRIVE: Primary | ICD-10-CM

## 2017-04-21 DIAGNOSIS — G30.9 ALZHEIMER'S DEMENTIA WITH BEHAVIORAL DISTURBANCE, UNSPECIFIED TIMING OF DEMENTIA ONSET: ICD-10-CM

## 2017-04-24 ENCOUNTER — ANTI-COAG VISIT (OUTPATIENT)
Dept: CARDIOLOGY | Facility: CLINIC | Age: 72
End: 2017-04-24

## 2017-04-24 LAB — INR PPP: 2.5

## 2017-04-25 ENCOUNTER — TELEPHONE (OUTPATIENT)
Dept: FAMILY MEDICINE | Facility: CLINIC | Age: 72
End: 2017-04-25

## 2017-04-25 NOTE — TELEPHONE ENCOUNTER
----- Message from Dorothea Guo sent at 4/25/2017 11:38 AM CDT -----  Contact: Ruth from [facility] [phone] AND [phone]  Ruth called for orders and the H/P the family is ready and they will call [facility] as soon as they get the orders  Phone is [phone] and the fax [phone]

## 2017-04-25 NOTE — TELEPHONE ENCOUNTER
----- Message from Aminta Johnson sent at 4/25/2017  1:19 PM CDT -----  Contact: Ward/brother in law  Ward called to speak with the nurse; he stated they spoke with hospice but Dr. Valle has to put the order in. He would like someone to call them about this. He can be contacted at [phone].    Thanks,  Aminta

## 2017-04-25 NOTE — TELEPHONE ENCOUNTER
Called in to [facility]. They got the order for the referral Friday 4/21/17, but not the order for admit to Hospice. They also needed the H&P. Dr. Valle was notified verbally. I spoke to family explaining that we had cleared up request. Voiced understanding.

## 2017-04-26 NOTE — TELEPHONE ENCOUNTER
----- Message from Richie Trinh sent at 4/26/2017  9:44 AM CDT -----  Contact: Shanon/[facility]  Unsuccessful call placed to pod.  Shanon called in and stated she missed a call from Dr. Valle's office regarding the attached patient.    Shanon's call back number is [phone]

## 2017-04-26 NOTE — TELEPHONE ENCOUNTER
----- Message from Chrissy Chua sent at 4/25/2017  4:04 PM CDT -----  Contact: Brenda cole/ [facility]/ [facility] is requesting the Nurse call her back at .  Thanks!

## 2017-04-26 NOTE — TELEPHONE ENCOUNTER
Attempted to contact [facility] and [facility] was out. Was given her cell [phone] attempted to contact Nunu on cell no answer. Jose

## 2017-04-26 NOTE — TELEPHONE ENCOUNTER
Spoke to nurse Nunu and she needs office visits faxed concerning pt Alzheimer. Would like copy of hospital visit as well. Please print and fax as many to [phone]. Please and thank you.

## 2017-04-27 ENCOUNTER — ANTI-COAG VISIT (OUTPATIENT)
Dept: CARDIOLOGY | Facility: CLINIC | Age: 72
End: 2017-04-27

## 2017-06-07 ENCOUNTER — TELEPHONE (OUTPATIENT)
Dept: FAMILY MEDICINE | Facility: CLINIC | Age: 72
End: 2017-06-07